# Patient Record
Sex: FEMALE | Race: WHITE | HISPANIC OR LATINO | Employment: FULL TIME | URBAN - METROPOLITAN AREA
[De-identification: names, ages, dates, MRNs, and addresses within clinical notes are randomized per-mention and may not be internally consistent; named-entity substitution may affect disease eponyms.]

---

## 2024-11-09 ENCOUNTER — APPOINTMENT (OUTPATIENT)
Dept: RADIOLOGY | Facility: MEDICAL CENTER | Age: 39
End: 2024-11-09
Attending: EMERGENCY MEDICINE
Payer: MEDICAID

## 2024-11-09 ENCOUNTER — HOSPITAL ENCOUNTER (EMERGENCY)
Facility: MEDICAL CENTER | Age: 39
End: 2024-11-09
Attending: EMERGENCY MEDICINE
Payer: MEDICAID

## 2024-11-09 VITALS
RESPIRATION RATE: 20 BRPM | DIASTOLIC BLOOD PRESSURE: 75 MMHG | TEMPERATURE: 97 F | HEIGHT: 70 IN | BODY MASS INDEX: 28.77 KG/M2 | HEART RATE: 80 BPM | WEIGHT: 201 LBS | SYSTOLIC BLOOD PRESSURE: 119 MMHG | OXYGEN SATURATION: 96 %

## 2024-11-09 DIAGNOSIS — G44.89 OTHER HEADACHE SYNDROME: ICD-10-CM

## 2024-11-09 DIAGNOSIS — R55 VASOVAGAL SYNCOPE: ICD-10-CM

## 2024-11-09 LAB
ANION GAP SERPL CALC-SCNC: 8 MMOL/L (ref 7–16)
BASOPHILS # BLD AUTO: 0.5 % (ref 0–1.8)
BASOPHILS # BLD: 0.04 K/UL (ref 0–0.12)
BUN SERPL-MCNC: 19 MG/DL (ref 8–22)
CALCIUM SERPL-MCNC: 9.3 MG/DL (ref 8.5–10.5)
CHLORIDE SERPL-SCNC: 106 MMOL/L (ref 96–112)
CO2 SERPL-SCNC: 23 MMOL/L (ref 20–33)
CREAT SERPL-MCNC: 0.84 MG/DL (ref 0.5–1.4)
EKG IMPRESSION: NORMAL
EOSINOPHIL # BLD AUTO: 0.14 K/UL (ref 0–0.51)
EOSINOPHIL NFR BLD: 1.7 % (ref 0–6.9)
ERYTHROCYTE [DISTWIDTH] IN BLOOD BY AUTOMATED COUNT: 40.7 FL (ref 35.9–50)
GFR SERPLBLD CREATININE-BSD FMLA CKD-EPI: 90 ML/MIN/1.73 M 2
GLUCOSE BLD STRIP.AUTO-MCNC: 88 MG/DL (ref 65–99)
GLUCOSE SERPL-MCNC: 95 MG/DL (ref 65–99)
HCT VFR BLD AUTO: 42.1 % (ref 37–47)
HGB BLD-MCNC: 13.8 G/DL (ref 12–16)
IMM GRANULOCYTES # BLD AUTO: 0.02 K/UL (ref 0–0.11)
IMM GRANULOCYTES NFR BLD AUTO: 0.2 % (ref 0–0.9)
LYMPHOCYTES # BLD AUTO: 2.43 K/UL (ref 1–4.8)
LYMPHOCYTES NFR BLD: 29.9 % (ref 22–41)
MCH RBC QN AUTO: 29 PG (ref 27–33)
MCHC RBC AUTO-ENTMCNC: 32.8 G/DL (ref 32.2–35.5)
MCV RBC AUTO: 88.4 FL (ref 81.4–97.8)
MONOCYTES # BLD AUTO: 0.54 K/UL (ref 0–0.85)
MONOCYTES NFR BLD AUTO: 6.7 % (ref 0–13.4)
NEUTROPHILS # BLD AUTO: 4.95 K/UL (ref 1.82–7.42)
NEUTROPHILS NFR BLD: 61 % (ref 44–72)
NRBC # BLD AUTO: 0 K/UL
NRBC BLD-RTO: 0 /100 WBC (ref 0–0.2)
PLATELET # BLD AUTO: 288 K/UL (ref 164–446)
PMV BLD AUTO: 9.5 FL (ref 9–12.9)
POTASSIUM SERPL-SCNC: 4.4 MMOL/L (ref 3.6–5.5)
RBC # BLD AUTO: 4.76 M/UL (ref 4.2–5.4)
SODIUM SERPL-SCNC: 137 MMOL/L (ref 135–145)
WBC # BLD AUTO: 8.1 K/UL (ref 4.8–10.8)

## 2024-11-09 PROCEDURE — 96374 THER/PROPH/DIAG INJ IV PUSH: CPT

## 2024-11-09 PROCEDURE — 93005 ELECTROCARDIOGRAM TRACING: CPT | Performed by: EMERGENCY MEDICINE

## 2024-11-09 PROCEDURE — 36415 COLL VENOUS BLD VENIPUNCTURE: CPT

## 2024-11-09 PROCEDURE — 85025 COMPLETE CBC W/AUTO DIFF WBC: CPT

## 2024-11-09 PROCEDURE — 70450 CT HEAD/BRAIN W/O DYE: CPT

## 2024-11-09 PROCEDURE — 80048 BASIC METABOLIC PNL TOTAL CA: CPT

## 2024-11-09 PROCEDURE — 99285 EMERGENCY DEPT VISIT HI MDM: CPT

## 2024-11-09 PROCEDURE — 82962 GLUCOSE BLOOD TEST: CPT

## 2024-11-09 PROCEDURE — 700111 HCHG RX REV CODE 636 W/ 250 OVERRIDE (IP): Mod: JZ,UD | Performed by: EMERGENCY MEDICINE

## 2024-11-09 RX ADMIN — FENTANYL CITRATE 50 MCG: 50 INJECTION, SOLUTION INTRAMUSCULAR; INTRAVENOUS at 07:40

## 2024-11-09 ASSESSMENT — PAIN DESCRIPTION - PAIN TYPE
TYPE: ACUTE PAIN

## 2024-11-09 NOTE — ED TRIAGE NOTES
"Chief Complaint   Patient presents with    T-5000 GLF    Head Injury     Pt BIBA from work, EMS reports pt was feeling at first dizzy and had a syncopal episode, +HS, -blood thinners. Pt complaining of left sided pain on her face. En route given Zofran 4 mg ODT.      Pt complaining of headache as well. Also feeling tingling sensation on both legs. No cuts or other injury noted on other parts of the body. POC glucose-88mg/dl    Pain:10/10    Pt is alert and oriented x 4, speaking in full sentences, follows commands and responds appropriately to questions. Respirations are even and unlabored.    Obtained information via ,  at bedside and reports pt has familial issue in [facility].       /77   Pulse 69   Temp 36.7 °C (98 °F) (Temporal)   Ht 1.79 m (5' 10.47\")   Wt 91.2 kg (201 lb)   LMP 11/04/2024 (Approximate)   SpO2 97%   BMI 28.46 kg/m²     "

## 2024-11-09 NOTE — ED PROVIDER NOTES
ED Provider Note    CHIEF COMPLAINT  Chief Complaint   Patient presents with    T-5000 GLF    Head Injury     Pt BIBA from work, EMS reports pt was feeling at first dizzy and had a syncopal episode, +HS, -blood thinners. Pt complaining of left sided pain on her face. En route given Zofran 4 mg ODT.     Syncope    Dizziness       EXTERNAL RECORDS REVIEWED  No prior encounters in our EMR    HPI/ROS  LIMITATION TO HISTORY   Select: Language Togolese,  Used   OUTSIDE HISTORIAN(S):  Significant other spouse  Help of language line  #[id number] Edwin Scott is a 39 y.o. female who presents to the emergency department accompanied by her partner.  Patient was apparently at work.  Per report from her partner, she works at a [employer] company.  They had recently been given bad news from family members in [facility] and this event occurred after that.  She denies any other inciting events.  She states she ate normally.  Her work duties were normal.  She does not have a fever.  No cough.  No vomiting or diarrhea.  She has not recently been ill.  Partner believes that her blood pressure dropped causing this episode.  She is complaining of headache, left-sided at this time.  She states she does have a history of headaches.  She has had 2 prior ones that this 1 is slightly different.  She is unsure if she struck her head but her partner at bedside states that she did not.  She was sitting in a chair when this happened and when she came to, she was still in the chair.  She denies any visual changes although she does have light sensitivity.  She has pain to the left side of her face as well.  She is otherwise been in her normal state of health.  She recently [place] this country.  She had some routine testing done on arrival and was told that she is prediabetic but does not take any medications and does not have any allergies.  She reports a history of the procedure which allows her to no longer  "have children.  She has not had a period for 3 months.  She denies the possibility of pregnancy.  She has her next primary care appointment on November 19 with her PCP.    PAST MEDICAL HISTORY   Prediabetes    SURGICAL HISTORY  patient denies any surgical history    FAMILY HISTORY  History reviewed. No pertinent family history.    SOCIAL HISTORY  Social History     Tobacco Use    Smoking status: Never    Smokeless tobacco: Never   Vaping Use    Vaping status: Never Used   Substance and Sexual Activity    Alcohol use: Never    Drug use: Never    Sexual activity: Not on file       CURRENT MEDICATIONS  Home Medications       Reviewed by Pily Almonte R.N. (Registered Nurse) on 11/09/24 at 0542  Med List Status: Partial     Medication Last Dose Status        Patient Clayton Taking any Medications                           ALLERGIES  No Known Allergies    PHYSICAL EXAM  VITAL SIGNS: /75   Pulse 80   Temp 36.1 °C (97 °F)   Resp 20   Ht 1.79 m (5' 10.47\")   Wt 91.2 kg (201 lb)   LMP 11/04/2024 (Approximate)   SpO2 96%   BMI 28.46 kg/m²    Vitals reviewed.  Constitutional: Patient is oriented to person, place, and time. Appears well-developed and well-nourished. Mild distress.    Head/Face: Normocephalic and atraumatic. TTP over right maxillary sinus.  Ears: Normal external ears bilaterally.   Mouth/Throat: Oropharynx is clear and moist, no exudates.   Eyes: Conjunctivae are normal. Pupils are equal, round, and reactive to light.   Neck: Normal range of motion. Neck supple.  Cardiovascular: Normal rate, regular rhythm and normal heart sounds. Normal peripheral pulses.  Pulmonary/Chest: Effort normal and breath sounds normal. No respiratory distress, no wheezes, rhonchi, or rales.  Abdominal: Soft. Bowel sounds are normal. There is no tenderness, rebound or guarding. No CVA tenderness.  Musculoskeletal: No edema and no tenderness.   Lymphadenopathy: No cervical adenopathy.   Neurological: No cranial nerve " deficits. Normal motor and sensory exam, except as noted.  Patient reports decreased sensation although she can feel, pinpoint to her left foot.. No focal deficits. Normal gait.   Skin: Skin is warm and dry. No erythema. No pallor.   Psychiatric: Patient has a normal mood and affect.     EKG/LABS  Results for orders placed or performed during the hospital encounter of 24   POCT glucose device results    Collection Time: 24  6:01 AM   Result Value Ref Range    POC Glucose, Blood 88 65 - 99 mg/dL   EKG    Collection Time: 24  6:22 AM   Result Value Ref Range    Report       [facility]    Test Date:  2024  Pt Name:    GUANAKITO AWAN        Department: ER  MRN:        [id number]                      Room:       [facility]  Gender:     Female                       Technician: [id number]  :        1985                   Requested By:ER TRIAGE PROTOCOL  Order #:    [id number]                    Reading MD: ELISABETH JACK DO    Measurements  Intervals                                Axis  Rate:       58                           P:          8  NJ:         179                          QRS:        74  QRSD:       90                           T:          33  QT:         410  QTc:        403    Interpretive Statements  Sinus bradycardia  No previous ECG available for comparison  Electronically Signed On 2024 10:07:21 PST by ELISABETH JACK DO     CBC WITH DIFFERENTIAL    Collection Time: 24  7:42 AM   Result Value Ref Range    WBC 8.1 4.8 - 10.8 K/uL    RBC 4.76 4.20 - 5.40 M/uL    Hemoglobin 13.8 12.0 - 16.0 g/dL    Hematocrit 42.1 37.0 - 47.0 %    MCV 88.4 81.4 - 97.8 fL    MCH 29.0 27.0 - 33.0 pg    MCHC 32.8 32.2 - 35.5 g/dL    RDW 40.7 35.9 - 50.0 fL    Platelet Count 288 164 - 446 K/uL    MPV 9.5 9.0 - 12.9 fL    Neutrophils-Polys 61.00 44.00 - 72.00 %    Lymphocytes 29.90 22.00 - 41.00 %    Monocytes 6.70 0.00 - 13.40 %    Eosinophils 1.70 0.00 -  6.90 %    Basophils 0.50 0.00 - 1.80 %    Immature Granulocytes 0.20 0.00 - 0.90 %    Nucleated RBC 0.00 0.00 - 0.20 /100 WBC    Neutrophils (Absolute) 4.95 1.82 - 7.42 K/uL    Lymphs (Absolute) 2.43 1.00 - 4.80 K/uL    Monos (Absolute) 0.54 0.00 - 0.85 K/uL    Eos (Absolute) 0.14 0.00 - 0.51 K/uL    Baso (Absolute) 0.04 0.00 - 0.12 K/uL    Immature Granulocytes (abs) 0.02 0.00 - 0.11 K/uL    NRBC (Absolute) 0.00 K/uL   Basic Metabolic Panel    Collection Time: 11/09/24  7:42 AM   Result Value Ref Range    Sodium 137 135 - 145 mmol/L    Potassium 4.4 3.6 - 5.5 mmol/L    Chloride 106 96 - 112 mmol/L    Co2 23 20 - 33 mmol/L    Glucose 95 65 - 99 mg/dL    Bun 19 8 - 22 mg/dL    Creatinine 0.84 0.50 - 1.40 mg/dL    Calcium 9.3 8.5 - 10.5 mg/dL    Anion Gap 8.0 7.0 - 16.0   ESTIMATED GFR    Collection Time: 11/09/24  7:42 AM   Result Value Ref Range    GFR (CKD-EPI) 90 >60 mL/min/1.73 m 2       I have independently interpreted this EKG    RADIOLOGY/PROCEDURES   I have independently interpreted the diagnostic imaging associated with this visit and am waiting the final reading from the radiologist.   My preliminary interpretation is as follows: No ICH.    Radiologist interpretation:  CT-HEAD W/O   Final Result   Impression:      1. No acute process in the brain evident.      2. Chronic left maxillary sinusitis with complete opacification of the sinus.                      COURSE & MEDICAL DECISION MAKING    ASSESSMENT, COURSE AND PLAN  Care Narrative:     This is a pleasant 39-year-old Faroese-speaking female.  History obtained through language line .  Patient is complaining of left-sided headache.  This started at 5:00 this morning.  Given the time of onset, I ordered CT imaging without contrast.  This would be within the 6-hour window.  She has no neurologic deficits.  No visual changes.  She does have light sensitivity.  She was complaining of pain in her left leg.  She is up, ambulating to the restroom at  this time.  She has a steady gait.  It certainly possible, that this was a vasovagal episode related to the news that they received.  She has reassuring vital signs at this time.  She is requesting something for pain which is provided.    10:09 AM patient is reevaluated at the bedside with the help of language line  #[id number],  Patient is resting and appears comfortable.  She awakens for reexamination.  She denies any numbness or tingling.  This has resolved and her left lower extremity.  She reports minimal headache pain.  Vital signs are reassuring.  We discussed lab and CT imagings which shows no evidence of intracranial hemorrhage or pathology other than the left maxillary sinus being completely opacified which I suspect is causing her pain.  She does have maxillary sinus pain.  She has not been having a fever.  I do not think there is an indication currently for antibiotic therapy.  I have suggested a decongestant which she can obtain over-the-counter.  She has an upcoming appointment with her primary care doctor on November 19, she is encouraged to keep this appointment.  Both she and her  are given an opportunity for questions.  I think he is likely correct about the etiology of the syncopal episode.  He thinks the bad news with delivered in a poor way when they were at work and she did not have an appropriate place to respond.  Patient is feeling better now.  At this point, I feel she can safely be discharged to home.  She is in stable condition.    ADDITIONAL PROBLEMS MANAGED    DISPOSITION AND DISCUSSIONS  I have discussed management of the patient with the following physicians and CHUCK's:  None    Discussion of management with other QHP or appropriate source(s): None     Escalation of care considered, and ultimately not performed:acute inpatient care management, however at this time, the patient is most appropriate for outpatient management    Barriers to care at this time,  including but not limited to: None.     Decision tools and prescription drugs considered including, but not limited to: None.    FINAL DIAGNOSIS  1. Vasovagal syncope    2. Other headache syndrome         Electronically signed by: Talita Gipson D.O., 11/9/2024 6:20 AM

## 2024-11-09 NOTE — ED NOTES
used: [id number].Pt discharge home. Pt and  given discharge instructions  Pt verbalized understanding, all questions answered ,vss upon d/c. Pt steady on feet upon discharge

## 2025-04-14 ENCOUNTER — HOSPITAL ENCOUNTER (EMERGENCY)
Facility: MEDICAL CENTER | Age: 40
End: 2025-04-14
Attending: EMERGENCY MEDICINE
Payer: COMMERCIAL

## 2025-04-14 ENCOUNTER — APPOINTMENT (OUTPATIENT)
Dept: RADIOLOGY | Facility: MEDICAL CENTER | Age: 40
End: 2025-04-14
Attending: EMERGENCY MEDICINE
Payer: COMMERCIAL

## 2025-04-14 VITALS
TEMPERATURE: 98.3 F | DIASTOLIC BLOOD PRESSURE: 81 MMHG | HEIGHT: 68 IN | BODY MASS INDEX: 31.27 KG/M2 | SYSTOLIC BLOOD PRESSURE: 121 MMHG | OXYGEN SATURATION: 98 % | RESPIRATION RATE: 16 BRPM | WEIGHT: 206.35 LBS | HEART RATE: 83 BPM

## 2025-04-14 DIAGNOSIS — S90.32XA CONTUSION OF LEFT FOOT, INITIAL ENCOUNTER: ICD-10-CM

## 2025-04-14 DIAGNOSIS — S83.92XA SPRAIN OF LEFT KNEE, UNSPECIFIED LIGAMENT, INITIAL ENCOUNTER: ICD-10-CM

## 2025-04-14 PROCEDURE — 73590 X-RAY EXAM OF LOWER LEG: CPT | Mod: LT

## 2025-04-14 PROCEDURE — 99284 EMERGENCY DEPT VISIT MOD MDM: CPT

## 2025-04-14 PROCEDURE — 73630 X-RAY EXAM OF FOOT: CPT | Mod: LT

## 2025-04-14 NOTE — ED TRIAGE NOTES
Chief Complaint   Patient presents with    Foot Pain     Pt reports she fell onto sharp metal object and has pain to top of her L foot    Leg Swelling     Pt ambulatory to triage for above complaint.     Pt is alert & oriented and follows commands. Pt speaking in full sentences and responds appropriately to questions. No acute distress noted in triage. Respirations are even and unlabored. Skin is pink/warm/dry.    Pt placed back in lobby and educated on triage process. Pt encouraged to alert staff to any changes in condition.

## 2025-04-14 NOTE — LETTER
"  EMPLOYEE’S CLAIM FOR COMPENSATION/ REPORT OF INITIAL TREATMENT  FORM C-4  PLEASE TYPE OR PRINT    EMPLOYEE’S CLAIM - PROVIDE ALL INFORMATION REQUESTED   First Name                    DANIELA Payne                  Last Name  William Scott Birthdate                    1985                Sex  [x]Female Claim Number (Insurer’s Use Only)     Mailing Address  [address] Age  40 y.o. Height  1.727 m (5' 8\") Weight  93.6 kg (206 lb 5.6 oz) Social Security Number  [id number]   [facility] Zip  [address] Telephone  [phone] (home)    Email  No e-mail address on record    Primary Language Spoken  Thai    INSURER   THIRD-PARTY   [place]   Employee's Occupation (Job Title) When Injury or Occupational Disease Occurred  [employer]    Employer's Name/Company Name    [employer] Telephone   [phone]   Office Mail Address (Number and Street)    [address]   Date of Injury (if applicable) 2025               Hours Injury (if applicable)  10:00 AM am    pm Date Employer Notified  4/10/2025 Last Day of Work after Injury or Occupational Disease  2025 Supervisor to Whom Injury Reported  Alexi   Address or Location of Accident (if applicable)  [address]   What were you doing at the time of accident? (if applicable)  Estaba empacando en la maquina    How did this injury or occupational disease occur? (Be specific and answer in detail. Use additional sheet if necessary)  Estaba en la maquina empacando y de repente me  un pedazo de acsey en el pie   If you believe that you have an occupational disease, when did you first have knowledge of the disability and its relationship to your employment?  None Witnesses to the Accident (if applicable)  [place]      Nature of Injury or Occupational Disease  Puncture  Part(s) of Body Injured or Affected  Foot (L) Lower Leg (L) N/A    I " CERTIFY THAT THE ABOVE IS TRUE AND CORRECT TO T HE BEST OF MY KNOWLEDGE AND THAT I HAVE PROVIDED THIS INFORMATION IN ORDER TO OBTAIN THE BENEFITS OF [place] OR [place]  I HEREBY AUTHORIZE ANY PHYSICIAN, CHIROPRACTOR, SURGEON, PRACTITIONER OR ANY OTHER PERSON, ANY HOSPITAL, INCLUDING [facility] OR [facility], ANY  MEDICAL SERVICE ORGANIZATION, ANY INSURANCE COMPANY, OR OTHER INSTITUTION OR ORGANIZATION TO RELEASE TO EACH OTHER, ANY MEDICAL OR OTHER INFORMATION, INCLUDING BENEFITS PAID OR PAYABLE, PERTINENT TO THIS INJURY OR DISEASE, EXCEPT INFORMATION RELATIVE TO DIAGNOSIS, TREATMENT AND/OR COUNSELING FOR AIDS, PSYCHOLOGICAL CONDITIONS, ALCOHOL OR CONTROLLED SUBSTANCES, FOR WHICH I MUST GIVE SPECIFIC AUTHORIZATION.  A PHOTOSTAT OF THIS AUTHORIZATION SHALL BE VALID AS THE ORIGINAL.     Date 04/14/2025   Place  [facility] Employee’s Original or  *Electronic Signature   THIS REPORT MUST BE COMPLETED AND MAILED WITHIN 3 WORKING DAYS OF TREATMENT   Place  [facility], EMERGENCY DEPT    Name of Facility   [facility] [place]   Date 4/14/2025 Diagnosis and Description of Injury or Occupational Disease  (S83.92XA) Sprain of left knee, unspecified ligament, initial encounter  (S90.32XA) Contusion of left foot, initial encounter  Diagnoses of Sprain of left knee, unspecified ligament, initial encounter and Contusion of left foot, initial encounter were pertinent to this visit. Is there evidence that the injured employee was under the influence of alcohol and/or another controlled substance at the time of accident?  []No  [] Yes (if yes, please explain)   Hour 1130      Treatment:      Have you advised the patient to remain off work five days or more?      [] Yes  If yes, indicate dates: From_ _                                                      To __ _  [] No   If no, is the injured employee capable of: []  full duty     [] modified duty      If modified duty, specify any limitations / restrictions:__________________  ___ ___________________________     X-Ray Findings:      From information given by the employee, together with medical evidence, can you directly connect this injury or occupational disease as job incurred?  []Yes   [] No      Is additional medical care by a physician indicated? []Yes [] No       Do you know of any previous injury or disease contributing to this condition or occupational disease? []Yes [] No (Explain if yes)                              Date  4/14/2025 Print Health Care Provider’s Name  Ruben Coley D.O. I certify that the employer’s copy of  this form was delivered to the employer on:   Address   [address] [place] [address] INSURER'S USE ONLY                       [facility] Zip   [address]   Provider’s Tax ID Number   [id number]   Telephone  Dept: [phone]    Health Care Provider’s Original or Electronic Signature           Degree (MD,DO, DC,PADionneC,APRN)  DO  Choose (if applicable)      ORIGINAL - TREATING HEALTHCARE PROVIDER PAGE 2 - INSURER/TPA PAGE 3 - EMPLOYER PAGE 4 - EMPLOYEE             Form C-4 (rev.02/25)

## 2025-04-14 NOTE — LETTER
"  FORM C-4:  EMPLOYEE’S CLAIM FOR COMPENSATION/ REPORT OF INITIAL TREATMENT  EMPLOYEE’S CLAIM - PROVIDE ALL INFORMATION REQUESTED   First Name Elmer Last Name William Scott Birthdate 1985  Sex female Claim Number   Home Address [address]   [facility]             Zip [address]                                   Age  40 y.o. Height  1.727 m (5' 8\") Weight  93.6 kg (206 lb 5.6 oz) [facility]     Mailing Address [address]  [facility]              Zip [address] Telephone  [phone] (home)  Primary Language Spoken  Nicaraguan    Insurer   Third Party   [place] [employer] Employee's Occupation (Job Title) When Injury or Occupational Disease Occurred     Employer's Name [employer]  Telephone [phone]    Employer Address [address] [facility] [address] Zip [address]   Date of Injury  2025       Hour of Injury  10:00 AM Date Employer Notified  4/10/2025 Last Day of Work after Injury or Occupational Disease  2025 Supervisor to Whom Injury Reported  Alexi   Address or Location of Accident (if applicable) Work [1]   What were you doing at the time of accident? (if applicable) Estaba empacando en la maquina    How did this injury or occupational disease occur? Be specific and answer in detail. Use additional sheet if necessary)  Estaba en la maquina empacando y de repente me  un pedazo de casey en el pie   If you believe that you have an occupational disease, when did you first have knowledge of the disability and it relationship to your employment? N/A Witnesses to the Accident  Bibi   Nature of Injury or Occupational Disease  Puncture Part(s) of Body Injured or Affected  Foot (L), Lower Leg (L), N/A    I CERTIFY THAT THE ABOVE IS TRUE AND CORRECT TO THE BEST OF MY KNOWLEDGE AND THAT I HAVE PROVIDED THIS INFORMATION IN ORDER TO OBTAIN THE BENEFITS OF [place] [place]IVE OR CHAPTER 617 OF [place]RS).  I HEREBY AUTHORIZE ANY PHYSICIAN, CHIROPRACTOR, SURGEON, PRACTITIONER, OR OTHER PERSON, ANY HOSPITAL, INCLUDING [facility] OR [facility], ANY MEDICAL SERVICE ORGANIZATION, ANY INSURANCE COMPANY, OR OTHER INSTITUTION OR ORGANIZATION TO RELEASE TO EACH OTHER, ANY MEDICAL OR OTHER INFORMATION, INCLUDING BENEFITS PAID OR PAYABLE, PERTINENT TO THIS INJURY OR DISEASE, EXCEPT INFORMATION RELATIVE TO DIAGNOSIS, TREATMENT AND/OR COUNSELING FOR AIDS, PSYCHOLOGICAL CONDITIONS, ALCOHOL OR CONTROLLED SUBSTANCES, FOR WHICH I MUST GIVE SPECIFIC AUTHORIZATION.  A PHOTOSTAT OF THIS AUTHORIZATION SHALL BE AS VALID AS THE ORIGINAL.  Date 4/15/25                                      Place  [facility]                                                                           Employee’s Signature   THIS REPORT MUST BE COMPLETED AND MAILED WITHIN 3 WORKING DAYS OF TREATMENT   Place [facility], EMERGENCY DEPT                       Name of Facility [facility]   Date  4/14/2025 Diagnosis  (S83.92XA) Sprain of left knee, unspecified ligament, initial encounter  (S90.32XA) Contusion of left foot, initial encounter Is there evidence the injured employee was under the influence of alcohol and/or another controlled substance at the time of accident?   Hour  2:14 PM Description of Injury or Disease  Sprain of left knee, unspecified ligament, initial encounter  Contusion of left foot, initial encounter No   Treatment  Limited duty, over-the-counter pain medicine  Have you advised the patient to remain off work five days or more?         No   X-Ray Findings  Negative If Yes   From Date    To Date      From information given by the employee, together with medical evidence, can you directly connect this injury or occupational disease as job incurred? Yes If No, is employee capable of: Full Duty  No Modified Duty  Yes   Is additional medical care by  "a physician indicated? Yes If Modified Duty, Specify any Limitations / Restrictions   Only 30 to 40 minutes standing intervals with 5 to 10-minute break from standing.  This is to last until 4/16/2025 at which time she is to see your Workmen's Comp. clinic for reevaluation and disposition   Do you know of any previous injury or disease contributing to this condition or occupational disease? No    Date 4/14/2025 Print Doctor’s Name Cruzito Coley certify the employer’s copy of this form was mailed on:   Address [address] [place] [id number] [phone] INSURER’S USE ONLY   Provider’s Tax ID Number [id number] Telephone Dept: [phone]    Doctor’s Signature e-CRUZITO Elliott D.O. Degree MD      Form C-4 (rev.10/07)                                                                         BRIEF DESCRIPTION OF RIGHTS AND BENEFITS  (Pursuant to [place] 616C.050)    Notice of Injury or Occupational Disease (Incident Report Form C-1): If an injury or occupational disease (OD) arises out of and in the course of employment, you must provide written notice to your employer as soon as practicable, but no later than 7 days after the accident or OD. Your employer shall maintain a sufficient supply of the required forms.    Claim for Compensation (Form C-4): If medical treatment is sought, the form C-4 is available at the place of initial treatment. A completed \"Claim for Compensation\" (Form C-4) must be filed within 90 days after an accident or OD. The treating physician or chiropractor must, within 3 working days after treatment, complete and mail to the employer, the employer's insurer and third-party , the Claim for Compensation.    Medical Treatment: If you require medical treatment for your on-the-job injury or OD, you may be required to select a physician or chiropractor from a list provided by your workers’ compensation insurer, if it has contracted with an Organization for Managed Care (MCO) " or Preferred Provider Organization (PPO) or providers of health care. If your employer has not entered into a contract with an MCO or PPO, you may select a physician or chiropractor from the Panel of Physicians and Chiropractors. Any medical costs related to your industrial injury or OD will be paid by your insurer.    Temporary Total Disability (TTD): If your doctor has certified that you are unable to work for a period of at least 5 consecutive days, or 5 cumulative days in a 20-day period, or places restrictions on you that your employer does not accommodate, you may be entitled to TTD compensation.    Temporary Partial Disability (TPD): If the wage you receive upon reemployment is less than the compensation for TTD to which you are entitled, the insurer may be required to pay you TPD compensation to make up the difference. TPD can only be paid for a maximum of 24 months.    Permanent Partial Disability (PPD): When your medical condition is stable and there is an indication of a PPD as a result of your injury or OD, within 30 days, your insurer must arrange for an evaluation by a rating physician or chiropractor to determine the degree of your PPD. The amount of your PPD award depends on the date of injury, the results of the PPD evaluation, your age and wage.    Permanent Total Disability (PTD): If you are medically certified by a treating physician or chiropractor as permanently and totally disabled and have been granted a PTD status by your insurer, you are entitled to receive monthly benefits not to exceed 66 2/3% of your average monthly wage. The amount of your PTD payments is subject to reduction if you previously received a lump-sum PPD award.    Vocational Rehabilitation Services: You may be eligible for vocational rehabilitation services if you are unable to return to the job due to a permanent physical impairment or permanent restrictions as a result of your injury or occupational  disease.    Transportation and Per Dario Reimbursement: You may be eligible for travel expenses and per dario associated with medical treatment.    Reopening: You may be able to reopen your claim if your condition worsens after claim closure.     Appeal Process: If you disagree with a written determination issued by the insurer or the insurer does not respond to your request, you may appeal to the Department of Administration, , by following the instructions contained in your determination letter. You must appeal the determination within 70 days from the date of the determination letter at [address], [address], [place], [place] [address], or [address], [place], [place] [address]. If you disagree with the  decision, you may appeal to the Department of Administration, . You must file your appeal within 30 days from the date of the  decision letter at [address], [address], [place], [place] [address], or [address], [place], [place] [address]. If you disagree with a decision of an , you may file a petition for judicial review with the District Court. You must do so within 30 days of the Appeal Officer’s decision. You may be represented by an  at your own expense or you may contact the [facility] for possible representation.    [place]  for Injured Workers (NAIW): If you disagree with a  decision, you may request that NAIW represent you without charge at an  Hearing. For information regarding denial of benefits, you may contact the [facility] at: [address], [address], [place] [address], [phone], or [address] [facility] [address] [address], [phone]    To File a Complaint with the Division: If you wish to file a complaint with the  of the Division of Industrial Relations (DIR),  please contact the  Workers’ Compensation Section, [address], [address], [place], [place] [address], telephone (730) [phone], or [address], [address], [place], [place] [address], telephone [phone].    For assistance with Workers’ Compensation Issues: You may contact the [facility] Office for Consumer Health Assistance, [address], [address], [place], [place] [address], Toll Free 1-684.351.1867, Web site: http://[facility].[place].gov/Programs/DIDIER E-mail: didier@[facility].[place].gov  D-2 (rev. 10/20)              __________________________________________________________________                                    _________________            Employee Name / Signature                                                                                                                            Date

## 2025-04-14 NOTE — DISCHARGE INSTRUCTIONS
You are being discharged with limited duty, this is for 2 days.  You need to see your companies Workmen's Comp. clinic in 2 days ask your employer who they see.    In the meantime use Motrin and Tylenol for pain.

## 2025-04-14 NOTE — ED PROVIDER NOTES
"ER Provider Note    Scribed for Ruben Coley D.O. by Gracie Nuñez. 4/14/2025  10:17 AM    Primary Care Provider: Pcp Pt States None    CHIEF COMPLAINT  Chief Complaint   Patient presents with    Foot Pain     Pt reports she fell onto sharp metal object and has pain to top of her L foot    Leg Swelling       HPI/ROS  LIMITATION TO HISTORY   Language line  used ([place] Islander)    Elmer Scott is a 40 y.o. female who presents to the Emergency Department for left knee pain and swelling onset five days ago. The patient reports that she has also been having left foot pain after having a piece of mental fall on top of the foot. She states she saw a physical therapist the day after her injury and she was told to be on light duty. The patient notes that she returned to work the following day and had increased pain in her foot and knee. She states that walking exacerbates her pain.     ROS as per HPI.    PAST MEDICAL HISTORY  History reviewed. No pertinent past medical history.    SURGICAL HISTORY  History reviewed. No pertinent surgical history.    FAMILY HISTORY  History reviewed. No pertinent family history.    SOCIAL HISTORY   reports that she has never smoked. She has never used smokeless tobacco. She reports that she does not drink alcohol and does not use drugs.    CURRENT MEDICATIONS  None noted.     ALLERGIES  Patient has no known allergies.    PHYSICAL EXAM  /89   Pulse (!) 101   Temp 36.6 °C (97.9 °F) (Temporal)   Resp 16   Ht 1.727 m (5' 8\")   Wt 93.6 kg (206 lb 5.6 oz)   LMP 03/30/2025 (Exact Date)   SpO2 100%   BMI 31.38 kg/m²     General: No acute distress.  HENT: Normocephalic, Mucus membranes are moist.   Chest: Lungs have even and unlabored respirations, Clear to auscultation.   Cardiovascular: Regular rate and regular rhythm, No peripheral cyanosis.  Abdomen: Non distended.  Neuro: Awake, Conversive, Able to relay recent events.  Psychiatric: Calm and cooperative. "   Extremities: No swelling or tenderness of the left foot. Mild swelling of the left knee.      INITIAL ASSESSMENT  The patient had a work injury to her left foot on 04/09. I will evaluate the patient with an X-ray to exclude any fractures.     DIAGNOSTIC STUDIES    Radiology:   The attending emergency physician has independently interpreted the diagnostic imaging associated with this visit and am waiting the final reading from the radiologist.   Preliminary interpretation is as follows: No fracture  Radiologist interpretation:   DX-FOOT-COMPLETE 3+ LEFT   Final Result      No acute osseous abnormality.      DX-TIBIA AND FIBULA LEFT   Final Result      No radiographic evidence of acute traumatic injury.      No radiopaque foreign body.          COURSE & MEDICAL DECISION MAKING     COURSE AND PLAN  10:17 AM - Patient seen and examined at bedside. Discussed plan of care, including ordering imaging. Patient agrees to the plan of care. Ordered for DX-Tibia and Fibula Left, and DX- Foot-complete 3+ left to evaluate her symptoms.    11:10 AM - Patient was reevaluated at bedside. Discussed radiology results with the patient. I discussed plan for discharge and follow up as outlined below. I recommended that the patient remain on limited duty while at work for the next two days. Patient can use Tylenol or Motrin as needed for pain. The patient is stable for discharge at this time and will return for any new or worsening symptoms. Patient verbalizes understanding and support with my plan for discharge.     ED Summary: This patient has pain to the left foot and to the left knee.  At work something fell on her foot, she pulled back of the perhaps strained the knee.  She has been on limited duty.  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  She is otherwise stable to follow-up with the Workmen's Comp.  clinic.    DISPOSITION AND DISCUSSIONS    The patient is referred to a primary physician for blood pressure management, diabetic screening, and for all other preventative health concerns.    DISPOSITION:  Patient will be discharged home in stable condition.    FOLLOW UP:    Please see your [employer]'s Comp. clinic in 2 days for follow-up.  In 2 days      FINAL DIAGNOSIS  1. Sprain of left knee, unspecified ligament, initial encounter    2. Contusion of left foot, initial encounter      IGracie (Scribe), am scribing for, and in the presence of, Ruben Coley D.O..    Electronically signed by: Gracie Nuñez (Scribe), 4/14/2025    IRuben D.O. personally performed the services described in this documentation, as scribed by Gracie Nuñez in my presence, and it is both accurate and complete.     The note accurately reflects work and decisions made by me.  Ruben Coley D.O.  4/14/2025  4:45 PM

## 2025-04-14 NOTE — LETTER
"  EMPLOYEE’S CLAIM FOR COMPENSATION/ REPORT OF INITIAL TREATMENT  FORM C-4  PLEASE TYPE OR PRINT    EMPLOYEE’S CLAIM - PROVIDE ALL INFORMATION REQUESTED   First Name                    DANIELA Payne                  Last Name  William Scott Birthdate                    1985                Sex  [x]Female Claim Number (Insurer’s Use Only)     Mailing Address  [address] Age  40 y.o. Height  1.727 m (5' 8\") Weight  93.6 kg (206 lb 5.6 oz) Social Security Number  [id number]   [facility] Zip  [address] Telephone  [phone] (home)    Email  No e-mail address on record    Primary Language Spoken  Setswana    INSURER  *** THIRD-PARTY   [employer]   Employee's Occupation (Job Title) When Injury or Occupational Disease Occurred  [employer]    Employer's Name/Company Name     Telephone      Office Mail Address (Number and Street)       Date of Injury (if applicable) 2025               Hours Injury (if applicable)  10:00 AM am    pm Date Employer Notified  4/10/2025 Last Day of Work after Injury or Occupational Disease  2025 Supervisor to Whom Injury Reported  Alexi   Address or Location of Accident (if applicable)  Work [1]   What were you doing at the time of accident? (if applicable)  Estaba empacando en la maquina    How did this injury or occupational disease occur? (Be specific and answer in detail. Use additional sheet if necessary)  Estaba en la maquina empacando y de repente me  un pedazo de casey en el pie   If you believe that you have an occupational disease, when did you first have knowledge of the disability and its relationship to your employment?  N/A Witnesses to the Accident (if applicable)  Bibi      Nature of Injury or Occupational Disease  Puncture  Part(s) of Body Injured or Affected  Foot (L) Lower Leg (L) N/A    I CERTIFY THAT THE ABOVE IS TRUE AND CORRECT " TO T HE BEST OF MY KNOWLEDGE AND THAT I HAVE PROVIDED THIS INFORMATION IN ORDER TO OBTAIN THE BENEFITS OF [place], [place], OR CHAPTER 617 OF [place]).  I HEREBY AUTHORIZE ANY PHYSICIAN, CHIROPRACTOR, SURGEON, PRACTITIONER OR ANY OTHER PERSON, ANY HOSPITAL, INCLUDING [facility] OR [facility], ANY  MEDICAL SERVICE ORGANIZATION, ANY INSURANCE COMPANY, OR OTHER INSTITUTION OR ORGANIZATION TO RELEASE TO EACH OTHER, ANY MEDICAL OR OTHER INFORMATION, INCLUDING BENEFITS PAID OR PAYABLE, PERTINENT TO THIS INJURY OR DISEASE, EXCEPT INFORMATION RELATIVE TO DIAGNOSIS, TREATMENT AND/OR COUNSELING FOR AIDS, PSYCHOLOGICAL CONDITIONS, ALCOHOL OR CONTROLLED SUBSTANCES, FOR WHICH I MUST GIVE SPECIFIC AUTHORIZATION.  A PHOTOSTAT OF THIS AUTHORIZATION SHALL BE VALID AS THE ORIGINAL.     Date   Place Employee’s Original or  *Electronic Signature   THIS REPORT MUST BE COMPLETED AND MAILED WITHIN 3 WORKING DAYS OF TREATMENT   Place  [facility], EMERGENCY DEPT    Name of Facility      Date 4/14/2025 Diagnosis and Description of Injury or Occupational Disease  (S83.92XA) Sprain of left knee, unspecified ligament, initial encounter  (S90.32XA) Contusion of left foot, initial encounter  Diagnoses of Sprain of left knee, unspecified ligament, initial encounter and Contusion of left foot, initial encounter were pertinent to this visit. Is there evidence that the injured employee was under the influence of alcohol and/or another controlled substance at the time of accident?  []No  [] Yes (if yes, please explain)   Hour   No   Treatment: Limited duty, over-the-counter pain medicine    Have you advised the patient to remain off work five days or more?   No  [] Yes  If yes, indicate dates: From_ _                                                      To __ _  [] No   If no, is the injured employee capable of: [] full duty No   [] modified  duty Yes    If modified duty, specify any limitations / restrictions:__________________  ___Only 30 to 40 minutes standing intervals with 5 to 10-minute break from standing.  This is to last until 4/16/2025 at which time she is to see your Workmen's Comp. clinic for reevaluation and disposition___________________________     X-Ray Findings: Negative    From information given by the employee, together with medical evidence, can you directly connect this injury or occupational disease as job incurred?  []Yes   [] No Yes    Is additional medical care by a physician indicated? []Yes [] No  Yes    Do you know of any previous injury or disease contributing to this condition or occupational disease? []Yes [x] No (Explain if yes)                          No   Date  4/14/2025 Print Health Care Provider’s Name  Cruzito Coley  I certify that the employer’s copy of  this form was delivered to the employer on:   Address [address] [place] [address]     INSURER'S USE ONLY                       [facility]  Zip [address] Provider’s Tax ID Number  [id number] Telephone  Dept: [phone]    Health Care Provider’s Original or Electronic Signature      e-CRUZITO Elliott R D.O.    Degree (MD,DO, DC,PA-C,APRN)  MD  Choose (if applicable)      ORIGINAL - TREATING HEALTHCARE PROVIDER PAGE 2 - INSURER/TPA PAGE 3 - EMPLOYER PAGE 4 - EMPLOYEE             Form C-4 (rev.02/25)

## 2025-05-05 ENCOUNTER — HOSPITAL ENCOUNTER (OUTPATIENT)
Dept: RADIOLOGY | Facility: MEDICAL CENTER | Age: 40
End: 2025-05-05
Attending: PHYSICIAN ASSISTANT
Payer: COMMERCIAL

## 2025-05-05 DIAGNOSIS — S83.92XA SPRAIN OF LEFT KNEE, INITIAL ENCOUNTER: ICD-10-CM

## 2025-05-05 PROCEDURE — 73562 X-RAY EXAM OF KNEE 3: CPT | Mod: LT
